# Patient Record
Sex: MALE | Race: OTHER
[De-identification: names, ages, dates, MRNs, and addresses within clinical notes are randomized per-mention and may not be internally consistent; named-entity substitution may affect disease eponyms.]

---

## 2018-01-27 ENCOUNTER — HOSPITAL ENCOUNTER (EMERGENCY)
Dept: HOSPITAL 62 - ER | Age: 23
Discharge: HOME | End: 2018-01-27
Payer: OTHER GOVERNMENT

## 2018-01-27 VITALS — DIASTOLIC BLOOD PRESSURE: 74 MMHG | SYSTOLIC BLOOD PRESSURE: 133 MMHG

## 2018-01-27 DIAGNOSIS — Z86.19: ICD-10-CM

## 2018-01-27 DIAGNOSIS — R36.9: ICD-10-CM

## 2018-01-27 DIAGNOSIS — N34.2: Primary | ICD-10-CM

## 2018-01-27 DIAGNOSIS — Z71.6: ICD-10-CM

## 2018-01-27 DIAGNOSIS — I10: ICD-10-CM

## 2018-01-27 DIAGNOSIS — F17.210: ICD-10-CM

## 2018-01-27 DIAGNOSIS — N48.89: ICD-10-CM

## 2018-01-27 LAB
APPEARANCE UR: (no result)
APTT PPP: YELLOW S
BILIRUB UR QL STRIP: NEGATIVE
CHLAM PCR: DETECTED
GLUCOSE UR STRIP-MCNC: NEGATIVE MG/DL
GON PCR: NOT DETECTED
KETONES UR STRIP-MCNC: NEGATIVE MG/DL
NITRITE UR QL STRIP: NEGATIVE
PH UR STRIP: 6 [PH] (ref 5–9)
PROT UR STRIP-MCNC: NEGATIVE MG/DL
SP GR UR STRIP: 1.02
T.VAGINALIS (WET MOUNT): (no result)
UROBILINOGEN UR-MCNC: NEGATIVE MG/DL (ref ?–2)
WBCS (WET MOUNT): (no result)
YEAST (WET MOUNT): (no result)

## 2018-01-27 PROCEDURE — 81001 URINALYSIS AUTO W/SCOPE: CPT

## 2018-01-27 PROCEDURE — 99283 EMERGENCY DEPT VISIT LOW MDM: CPT

## 2018-01-27 PROCEDURE — 87491 CHLMYD TRACH DNA AMP PROBE: CPT

## 2018-01-27 PROCEDURE — 87210 SMEAR WET MOUNT SALINE/INK: CPT

## 2018-01-27 PROCEDURE — 87591 N.GONORRHOEAE DNA AMP PROB: CPT

## 2018-01-27 NOTE — ER DOCUMENT REPORT
ED GI/





- General


Chief Complaint: Penile Pain


Stated Complaint: PENIAL PAIN


Time Seen by Provider: 01/27/18 13:23


Mode of Arrival: Ambulatory


Information source: Patient


Notes: 





22-year-old male presents to ED for complaint of penile pain was milky white 

discharge.  He states the pain is been for about a week the discharge is been 

for the last couple days.  He states he has not been to his primary doctor.  He 

was diagnosed with chlamydia a year ago.


TRAVEL OUTSIDE OF THE [place] IN LAST 30 DAYS: No





- HPI


Patient complains to provider of: Other - Penile pain and discharge


Onset: Last week


Timing/Duration: Gradual


Quality of pain: Burning


Severity at maximum: Moderate


Severity in ED: Moderate


Pain Level: 3


Location: Other - Penile pain


Sexual history: Active


Associated symptoms: Penile discharge, Other - Penile pain


Exacerbated by: Denies


Relieved by: Denies


Similar symptoms previously: Yes


Recently seen / treated by doctor: No





- Related Data


Allergies/Adverse Reactions: 


 





No Known Allergies Allergy (Verified 01/27/18 13:18)


 











Past Medical History





- General


Information source: Patient





- Social History


Smoking Status: Current Every Day Smoker


Cigarette use (# per day): Yes


Chew tobacco use (# tins/day): No


Smoking Education Provided: Yes - 4 minutes


Frequency of alcohol use: Social


Drug Abuse: None


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


Lives with: Other - [employer]


Family History: DM, Malignancy, Other


Patient has suicidal ideation: No


Patient has homicidal ideation: No





- Past Medical History


Cardiac Medical History: Reports: None


Pulmonary Medical History: Reports: None


EENT Medical History: Reports: None


Neurological Medical History: Reports: None


Endocrine Medical History: Reports: None


Renal/ Medical History: Reports: Other - Chlamydia a year ago


Malignancy Medical History: Reports None


GI Medical History: Reports: None


Musculoskeltal Medical History: Reports Hx Musculoskeletal Trauma


Skin Medical History: Reports None


Psychiatric Medical History: Reports: None


Traumatic Medical History: Reports: None


Infectious Medical History: Reports: None


Past Surgical History: Reports: Hx Oral Surgery - [place] teeth, Hx Orthopedic 

Surgery - Labral tear





- Immunizations


Immunizations up to date: Yes


Hx Diphtheria, Pertussis, Tetanus Vaccination: Yes


History of Influenza Vaccine for 10/2017 - 3/2018 Season: No





Review of Systems





- Review of Systems


Constitutional: No symptoms reported


EENT: No symptoms reported


Cardiovascular: No symptoms reported


Respiratory: No symptoms reported


Gastrointestinal: No symptoms reported


Genitourinary: No symptoms reported


Male Genitourinary: Penile discharge - and pain


Musculoskeletal: No symptoms reported


Skin: No symptoms reported


Hematologic/Lymphatic: No symptoms reported


Neurological/Psychological: No symptoms reported


-: Yes All other systems reviewed and negative





Physical Exam





- Vital signs


Vitals: 


 











Temp Pulse BP Pulse Ox


 


 98.4 F   97   149/71 H  96 


 


 01/27/18 13:21  01/27/18 13:21  01/27/18 13:21  01/27/18 13:21











Interpretation: Normal





- General


General appearance: Appears well, Alert





- HEENT


Head: Normocephalic, Atraumatic


Eyes: Normal


Pupils: PERRL





- Respiratory


Respiratory status: No respiratory distress


Chest status: Nontender


Breath sounds: Normal


Chest palpation: Normal





- Cardiovascular


Rhythm: Regular


Heart sounds: Normal auscultation


Murmur: No





- Abdominal


Inspection: Normal


Distension: No distension


Bowel sounds: Normal


Tenderness: Nontender


Organomegaly: No organomegaly





- Genitourinary


Inspection: Penile discharge


Tenderness: Nontender


Cremasteric reflex: Normal


Scrotum: Normal





- Back


Back: Normal, Nontender





- Extremities


General upper extremity: Normal inspection, Nontender, Normal color, Normal ROM

, Normal temperature


General lower extremity: Normal inspection, Nontender, Normal color, Normal ROM

, Normal temperature, Normal weight bearing.  No: Amarilys's sign





- Neurological


Neuro grossly intact: Yes


Cognition: Normal


Orientation: AAOx4


Michelle Coma Scale Eye Opening: Spontaneous


Michelle Coma Scale Verbal: Oriented


Taylor Coma Scale Motor: Obeys Commands


Taylor Coma Scale Total: 15


Speech: Normal


Motor strength normal: LUE, RUE, LLE, RLE


Sensory: Normal





- Psychological


Associated symptoms: Normal affect, Normal mood





- Skin


Skin Temperature: Warm


Skin Moisture: Dry


Skin Color: Normal





Course





- Re-evaluation


Re-evalutation: 





01/27/18 14:07


I have discussed the results of the wet mount and urine with the patient and 

given him a written report.  The GC chlamydia are not back yet I have discussed 

this with patient and he will call back in a couple hours to get these results.

  He will be treated with azithromycin and Rocephin before discharge.  He was 

positive for chlamydia a year ago.





- Vital Signs


Vital signs: 


 











Temp Pulse Resp BP Pulse Ox


 


 98.6 F   95   18   133/74 H  97 


 


 01/27/18 14:20  01/27/18 14:20  01/27/18 14:20  01/27/18 14:20  01/27/18 14:20














- Laboratory


Laboratory results interpreted by me: 


 











  01/27/18 01/27/18





  13:30 13:30


 


Ur Leukocyte Esterase   SMALL H


 


Chlamydia DNA (PCR)  DETECTED H 














Discharge





- Discharge


Clinical Impression: 


 Urethritis





HTN (hypertension)


Qualifiers:


 Hypertension type: unspecified Qualified Code(s): I10 - Essential (primary) 

hypertension





Condition: Stable


Disposition: HOME, SELF-CARE


Additional Instructions: 


Urethritis





     You have urethritis, an infection of the urethra.  The usual symptoms are 

pain on urination and discharge.  The infection is often caused by gonorrhea or 

chlamydia.


     Treatment is antibiotics.  In addition, any sexual contacts should be 

evaluated by a physician as soon as possible.  As this infection can be 

transmitted sexually, refrain from sexual activity until the infection is 

confirmed as healed by your physician.


     If gonorrhea or chlamydia is found on culture, the health department must 

be notified.


     Call the doctor at once if you develop difficulty passing your urine, high 

fever, rash, joint swelling, or other new symptoms.











CEPHALOSPORINS:


     An antibiotic of the cephalosporin class has been prescribed. This type of 

antibiotic covers a wide variety of infections, including those of the skin, 

lungs, middle ear, and urinary tract.


     This antibiotic is somewhat similar to the penicillin family. In rare cases

, a person who is allergic to penicillin will also be allergic to this 

medication.  If you have had a severe allergic reaction to penicillin, and have 

not taken this antibiotic since that time, notify your doctor.


     Antibiotics which cover many germs ("broad spectrum" antibiotics) are more 

likely to cause diarrhea or "yeast" infections.  Women prone to vaginal yeast 

problems may suffer an attack after taking this antibiotic.  In infants, oral 

thrush (white spots "stuck" on the cheek) or yeast diaper rash may result.  See 

your doctor if these problems occur.


     Call the doctor at once if you develop hives, itching, shortness of breath

, or lightheadedness.








AZITHROMYCIN:


     Azithromycin (Zithromax) is a broad spectrum antibiotic in the same class 

as erythromycin.  It can treat a variety of bacterial infections, but is most 

frequently used for respiratory infections.


     Azithromycin is extremely long-lasting.  It accumulates in body tissues 

and continues to kill bacteria for many days.


     In order to improve absorption, Azithromycin should be taken at least one 

hour before or two hours after a meal.  It does not have the same strong 

tendency to upset the stomach as erythromycin and is usually very well 

tolerated.


     Patients who have had a rash or other true allergic reactions to 

erythromycin should not take this medication.  Call if you develop 

gastrointestinal distress, severe diarrhea, rash, hives, itching, or shortness 

of breath.





Please call before 9 PM for the results of your GC and chlamydia.  The number 

is [phone].





FOLLOW-UP CARE:


If you have been referred to a physician for follow-up care, call the physician

s office for an appointment as you were instructed or within the next two days.

  If you experience worsening or a significant change in your symptoms, notify 

the physician immediately or return to the Emergency Department at any time for 

re-evaluation.





Please follow-up with your  doctor


Forms:  Elevated Blood Pressure, Smoking Cessation Education